# Patient Record
Sex: MALE | URBAN - METROPOLITAN AREA
[De-identification: names, ages, dates, MRNs, and addresses within clinical notes are randomized per-mention and may not be internally consistent; named-entity substitution may affect disease eponyms.]

---

## 2024-10-28 ENCOUNTER — TELEPHONE (OUTPATIENT)
Dept: SURGERY | Age: 74
End: 2024-10-28

## 2024-10-28 NOTE — TELEPHONE ENCOUNTER
Spoke with pts wife and he has also cancelled his surgery for 11/4/24. They both had surgeries done by Dr Bates in [place].